# Patient Record
Sex: MALE | Race: WHITE | Employment: OTHER | URBAN - METROPOLITAN AREA
[De-identification: names, ages, dates, MRNs, and addresses within clinical notes are randomized per-mention and may not be internally consistent; named-entity substitution may affect disease eponyms.]

---

## 2019-04-16 ENCOUNTER — HOSPITAL ENCOUNTER (EMERGENCY)
Age: 84
Discharge: HOME | End: 2019-04-16
Payer: MEDICARE

## 2019-04-16 VITALS
BODY MASS INDEX: 22.9 KG/M2 | HEIGHT: 70 IN | DIASTOLIC BLOOD PRESSURE: 57 MMHG | SYSTOLIC BLOOD PRESSURE: 141 MMHG | HEART RATE: 91 BPM | WEIGHT: 160 LBS | OXYGEN SATURATION: 97 % | TEMPERATURE: 97.2 F

## 2019-04-16 DIAGNOSIS — E16.2 HYPOGLYCEMIA: Primary | ICD-10-CM

## 2019-04-16 LAB
ANION GAP SERPL CALCULATED.3IONS-SCNC: 14 MMOL/L (ref 3–16)
BUN BLDV-MCNC: 24 MG/DL (ref 7–20)
CALCIUM SERPL-MCNC: 8.8 MG/DL (ref 8.3–10.6)
CHLORIDE BLD-SCNC: 91 MMOL/L (ref 99–110)
CO2: 28 MMOL/L (ref 21–32)
CREAT SERPL-MCNC: 1.3 MG/DL (ref 0.8–1.3)
GFR AFRICAN AMERICAN: >60
GFR NON-AFRICAN AMERICAN: 51
GLUCOSE BLD-MCNC: 133 MG/DL (ref 70–99)
GLUCOSE BLD-MCNC: 214 MG/DL (ref 70–99)
GLUCOSE BLD-MCNC: 238 MG/DL (ref 70–99)
HCT VFR BLD CALC: 34.5 % (ref 40.5–52.5)
HEMOGLOBIN: 11.5 G/DL (ref 13.5–17.5)
MCH RBC QN AUTO: 29.5 PG (ref 26–34)
MCHC RBC AUTO-ENTMCNC: 33.3 G/DL (ref 31–36)
MCV RBC AUTO: 88.3 FL (ref 80–100)
PDW BLD-RTO: 15.7 % (ref 12.4–15.4)
PERFORMED ON: ABNORMAL
PERFORMED ON: ABNORMAL
PLATELET # BLD: 248 K/UL (ref 135–450)
PMV BLD AUTO: 7.2 FL (ref 5–10.5)
POTASSIUM SERPL-SCNC: 3.8 MMOL/L (ref 3.5–5.1)
RBC # BLD: 3.9 M/UL (ref 4.2–5.9)
SODIUM BLD-SCNC: 133 MMOL/L (ref 136–145)
WBC # BLD: 8.4 K/UL (ref 4–11)

## 2019-04-16 PROCEDURE — 36415 COLL VENOUS BLD VENIPUNCTURE: CPT

## 2019-04-16 PROCEDURE — 85027 COMPLETE CBC AUTOMATED: CPT

## 2019-04-16 PROCEDURE — 99283 EMERGENCY DEPT VISIT LOW MDM: CPT

## 2019-04-16 PROCEDURE — 80048 BASIC METABOLIC PNL TOTAL CA: CPT

## 2019-04-16 ASSESSMENT — ENCOUNTER SYMPTOMS
NAUSEA: 0
SHORTNESS OF BREATH: 0
CONSTIPATION: 0
COLOR CHANGE: 0
ABDOMINAL PAIN: 0
VOMITING: 0
BACK PAIN: 0
COUGH: 0
DIARRHEA: 0

## 2019-04-17 NOTE — ED PROVIDER NOTES
 Hyperlipidemia     Hypertension     Pneumonia          Procedure Laterality Date    COLONOSCOPY      EYE SURGERY      cataracts left eye     EYE SURGERY      right eye tear duct surg    FRACTURE SURGERY      left hip pinning    HERNIA REPAIR      TONSILLECTOMY      as a child       Medications:  Discharge Medication List as of 4/16/2019 11:12 PM      CONTINUE these medications which have NOT CHANGED    Details   acetaminophen (TYLENOL) 500 MG tablet Take 2 tablets by mouth every 6 hours as needed for Pain, Disp-120 tablet, R-3      methylPREDNISolone (MEDROL DOSEPACK) 4 MG tablet Take by mouth as directed., Disp-21 tablet, R-0      metFORMIN (GLUCOPHAGE) 1000 MG tablet Take 1,000 mg by mouth 2 times daily (with meals). lansoprazole (PREVACID SOLUTAB) 30 MG disintegrating tablet Take 30 mg by mouth daily. Insulin Detemir (LEVEMIR FLEXPEN SC) Inject 10 Units into the skin nightly. furosemide (LASIX) 40 MG tablet Take 40 mg by mouth daily. fluticasone-salmeterol (ADVAIR) 100-50 MCG/DOSE diskus inhaler Inhale 1 puff into the lungs every 12 hours. Mometasone Furoate (NASONEX NA) by Nasal route. L-Methylfolate-B6-B12 (METANX PO) Take 1 tablet by mouth 2 times daily. Naproxen Sodium (ALEVE) 220 MG CAPS Take  by mouth. atorvastatin (LIPITOR) 10 MG tablet Take 10 mg by mouth daily. aspirin 81 MG tablet Take 81 mg by mouth daily. desonide (DESOWEN) 0.05 % lotion Apply  topically 2 times daily. Apply topically 2 times daily. , Topical, Historical Med      Emollient (CETAPHIL) cream Apply  topically as needed. Apply topically as needed., Topical, Historical Med      polyethylene glycol (GLYCOLAX) packet Take 17 g by mouth daily as needed. Fiber POWD Take  by mouth. Insulin Aspart (NOVOLOG FLEXPEN SC) Inject  into the skin 2 times daily (before meals). Sliding Scale.            Review of Systems:  Review of Systems   Constitutional: Negative for chills, fatigue and fever. Respiratory: Negative for cough and shortness of breath. Cardiovascular: Negative for chest pain. Gastrointestinal: Negative for abdominal pain, constipation, diarrhea, nausea and vomiting. Musculoskeletal: Negative for back pain and neck pain. Skin: Negative for color change and rash. All other systems reviewed and are negative. Positives and Pertinent negatives as per HPI. Except as noted above in the ROS, all other systems were reviewed/completed and are negative. Physical Exam:  Physical Exam   Constitutional: He is oriented to person, place, and time. He appears well-developed and well-nourished. He is active and cooperative. Non-toxic appearance. HENT:   Head: Normocephalic. Right Ear: External ear normal.   Left Ear: External ear normal.   Nose: Nose normal.   Eyes: Conjunctivae are normal. Right eye exhibits no discharge. Left eye exhibits no discharge. Neck: Normal range of motion. Neck supple. Cardiovascular: Normal rate, regular rhythm and normal heart sounds. Exam reveals no gallop and no friction rub. No murmur heard. Pulmonary/Chest: Effort normal and breath sounds normal. No respiratory distress. He has no wheezes. He has no rales. Musculoskeletal: Normal range of motion. Neurological: He is alert and oriented to person, place, and time. Skin: Skin is warm and dry. He is not diaphoretic. No pallor. Psychiatric: He has a normal mood and affect. His behavior is normal.   Nursing note and vitals reviewed.       MEDICAL DECISION MAKING    Vitals:    Vitals:    04/16/19 2107 04/16/19 2119   BP: (!) 141/57    Pulse: 91    Temp: 97.2 °F (36.2 °C)    TempSrc: Oral    SpO2: 97%    Weight:  160 lb (72.6 kg)   Height:  5' 10\" (1.778 m)       LABS:   Labs Reviewed   CBC - Abnormal; Notable for the following components:       Result Value    RBC 3.90 (*)     Hemoglobin 11.5 (*)     Hematocrit 34.5 (*)     RDW 15.7 (*)     All other components within normal limits    Narrative:     Performed at:  [facility]  [address] [facility] [place], [facility] [place]   Phone [phone]   BASIC METABOLIC PANEL - Abnormal; Notable for the following components:    Sodium 133 (*)     Chloride 91 (*)     Glucose 238 (*)     BUN 24 (*)     GFR Non- 51 (*)     All other components within normal limits    Narrative:     Performed at:  [facility]  [address] [facility] [place], [facility] [place]   Phone [phone]   POCT GLUCOSE - Abnormal; Notable for the following components:    POC Glucose 133 (*)     All other components within normal limits    Narrative:     Performed at:  [facility]  [address] [facility] [place], [facility] [place]   Phone [phone]   POCT GLUCOSE - Abnormal; Notable for the following components:    POC Glucose 214 (*)     All other components within normal limits    Narrative:     Performed at:  [facility]  [address] [facility] [place], [facility] [place]   Phone [phone] of labs reviewed and were negative at this time or not returned at the time of this note. RADIOLOGY:   Non-plain film images suchas CT, Ultrasound and MRI are read by the radiologist. INelda PA have directly visualized the radiologic plain film image(s) with the below findings:  Interpretation per the Radiologist below, if available at the time of this note:    No orders to display     [address] [place] [address] / ED COURSE:      PROCEDURES:   Procedures    Patient was given:  Medications - No data to display  This is a  80 y.o. male who presents to the emergency department with complaints of low blood sugar. Patient was brought in by squad from [facility] independent living with slight confusion. Upon arrival EMS reports the blood sugar was in the low 60s. Squad gave patient oral glucose and blood sugars come up to 136.   Patient states that he has had recent passing of his wife. Patient states that he only ate breakfast this morning but has not had anything else. Family was at bedside states that he tomorrow he is moving into an assisted living facility. Family states that he has taken all of his home medication and today which was reviewed by either aide at bedside or family members. The patient did not eat lunch or dinner. Patient states that he is currently feeling fine without any complaints, denies any chest pain, shortness breath, abdominal pain, recent illnesses, vomiting, diarrhea or constipation. No fevers or chills. On examination here patient is well-appearing, initial blood pressure was measured at 133. Blood work was then drawn later and blood sugar is got up to 238. Repeat blood sugar again checked at 214. I do believe the patient is safe for outpatient follow-up with primary care physician. Patient was fed prior to discharge. Family who is at bedside states that they will take this patient home until he moves into his assisted living facility tomorrow to make sure that he is eating and with normal mentation. I do believe that this is appropriate for this patient, discharged home with family in stable condition. The patient tolerated their visit well. I have evaluated the patient with physician available for consultation as needed. I have discussed the findings of today's workup with the patient and addressed the patient's questions and concerns. Important warning signs as well as new or worsening symptoms which wouldnecessitate immediate return to the ED were discussed. The plan is to discharge from the ED at this time, and the patient is in stable condition. The patient acknowledged understanding is agreeable with this plan. CLINICAL IMPRESSION:  1.  Hypoglycemia        DISPOSITION Decision To Discharge 04/16/2019 10:52:04 PM      PATIENT REFERRED TO:  [address] [place] [place] [place] [id number]  [phone]    Schedule an appointment as soon as possible for a visit       [facility] Emergency Department  [address] [facility]  [phone]  Go to   If symptoms worsen      DISCHARGE MEDICATIONS:  Discharge Medication List as of 4/16/2019 11:12 PM                 (Please note the MDM and HPI sections of this note were completed with avoice recognition program.  Efforts were made to edit the dictations but occasionally words are mis-transcribed.)    Electronically signed, [place] Palacio,            [place] Holloway  04/16/19 6009

## 2019-04-17 NOTE — ED NOTES
Bed: 12  Expected date:   Expected time:   Means of arrival: [facility]  Comments:  Ff Ara Councilman, RN  04/16/19 2101